# Patient Record
Sex: FEMALE | Race: WHITE | HISPANIC OR LATINO | URBAN - METROPOLITAN AREA
[De-identification: names, ages, dates, MRNs, and addresses within clinical notes are randomized per-mention and may not be internally consistent; named-entity substitution may affect disease eponyms.]

---

## 2019-10-15 ENCOUNTER — APPOINTMENT (OUTPATIENT)
Age: 39
Setting detail: DERMATOLOGY
End: 2019-10-15

## 2019-10-15 DIAGNOSIS — Q828 OTHER SPECIFIED ANOMALIES OF SKIN: ICD-10-CM

## 2019-10-15 DIAGNOSIS — L60.1 ONYCHOLYSIS: ICD-10-CM

## 2019-10-15 DIAGNOSIS — Q826 OTHER SPECIFIED ANOMALIES OF SKIN: ICD-10-CM

## 2019-10-15 DIAGNOSIS — Q819 OTHER SPECIFIED ANOMALIES OF SKIN: ICD-10-CM

## 2019-10-15 DIAGNOSIS — L81.1 CHLOASMA: ICD-10-CM

## 2019-10-15 PROBLEM — Q82.8 OTHER SPECIFIED CONGENITAL MALFORMATIONS OF SKIN: Status: ACTIVE | Noted: 2019-10-15

## 2019-10-15 PROCEDURE — OTHER COUNSELING: OTHER

## 2019-10-15 PROCEDURE — 99203 OFFICE O/P NEW LOW 30 MIN: CPT

## 2019-10-15 PROCEDURE — OTHER MIPS QUALITY: OTHER

## 2019-10-15 PROCEDURE — OTHER TREATMENT REGIMEN: OTHER

## 2019-10-15 ASSESSMENT — LOCATION DETAILED DESCRIPTION DERM
LOCATION DETAILED: LEFT LATERAL PROXIMAL UPPER ARM
LOCATION DETAILED: RIGHT INFERIOR CENTRAL MALAR CHEEK
LOCATION DETAILED: LEFT SMALL FINGERNAIL
LOCATION DETAILED: RIGHT GREAT TOENAIL
LOCATION DETAILED: LEFT GREAT TOENAIL
LOCATION DETAILED: LEFT INFERIOR CENTRAL MALAR CHEEK
LOCATION DETAILED: RIGHT LATERAL PROXIMAL UPPER ARM

## 2019-10-15 ASSESSMENT — LOCATION SIMPLE DESCRIPTION DERM
LOCATION SIMPLE: RIGHT GREAT TOE
LOCATION SIMPLE: RIGHT UPPER ARM
LOCATION SIMPLE: RIGHT CHEEK
LOCATION SIMPLE: LEFT CHEEK
LOCATION SIMPLE: LEFT UPPER ARM
LOCATION SIMPLE: LEFT GREAT TOE
LOCATION SIMPLE: LEFT SMALL FINGERNAIL

## 2019-10-15 ASSESSMENT — LOCATION ZONE DERM
LOCATION ZONE: ARM
LOCATION ZONE: FACE
LOCATION ZONE: TOENAIL
LOCATION ZONE: FINGERNAIL

## 2019-10-15 NOTE — PROCEDURE: TREATMENT REGIMEN
Detail Level: Zone
Plan: Will prescribe hydroquinone. Recommend in house pharmacy Melasma 8% cream.
Otc Regimen: Advised patient to use Amlactin

## 2019-10-15 NOTE — HPI: SKIN LESIONS
How Severe Is Your Skin Lesion?: mild
Have Your Skin Lesions Been Treated?: not been treated
Is This A New Presentation, Or A Follow-Up?: Skin Lesions
Additional History: Patient saw her PCP a week ago and was prescribed a anti-fungal cream, she states the cream hasn’t done much to help the affected areas. Patient states she has labs done to check if she was having any issues with her thyroid that was causing the issues in her nails. Patient had a clipping done on her nails and it came back negative for fungus.\\n\\nPatient denies preceding URI or GI symptoms, physical trauma to the affected areas, or psychosocial stressors, and does not take any prescription or OTC medications.

## 2020-01-06 ENCOUNTER — APPOINTMENT (OUTPATIENT)
Age: 40
Setting detail: DERMATOLOGY
End: 2020-01-07

## 2020-01-06 DIAGNOSIS — Z41.9 ENCOUNTER FOR PROCEDURE FOR PURPOSES OTHER THAN REMEDYING HEALTH STATE, UNSPECIFIED: ICD-10-CM

## 2020-01-06 DIAGNOSIS — Q826 OTHER SPECIFIED ANOMALIES OF SKIN: ICD-10-CM

## 2020-01-06 DIAGNOSIS — L57.8 OTHER SKIN CHANGES DUE TO CHRONIC EXPOSURE TO NONIONIZING RADIATION: ICD-10-CM

## 2020-01-06 DIAGNOSIS — L81.1 CHLOASMA: ICD-10-CM

## 2020-01-06 DIAGNOSIS — Q819 OTHER SPECIFIED ANOMALIES OF SKIN: ICD-10-CM

## 2020-01-06 DIAGNOSIS — L60.1 ONYCHOLYSIS: ICD-10-CM

## 2020-01-06 DIAGNOSIS — Q828 OTHER SPECIFIED ANOMALIES OF SKIN: ICD-10-CM

## 2020-01-06 PROBLEM — Q82.8 OTHER SPECIFIED CONGENITAL MALFORMATIONS OF SKIN: Status: ACTIVE | Noted: 2020-01-06

## 2020-01-06 PROCEDURE — OTHER PRODUCT LINE (HYPERPIGMENTATION): OTHER

## 2020-01-06 PROCEDURE — OTHER MIPS QUALITY: OTHER

## 2020-01-06 PROCEDURE — OTHER COUNSELING: OTHER

## 2020-01-06 PROCEDURE — OTHER COSMETIC CONSULTATION: BOTULINUM TOXIN: OTHER

## 2020-01-06 PROCEDURE — 99213 OFFICE O/P EST LOW 20 MIN: CPT

## 2020-01-06 PROCEDURE — OTHER OTHER: OTHER

## 2020-01-06 PROCEDURE — OTHER TREATMENT REGIMEN: OTHER

## 2020-01-06 ASSESSMENT — LOCATION DETAILED DESCRIPTION DERM
LOCATION DETAILED: LEFT CENTRAL MALAR CHEEK
LOCATION DETAILED: LEFT GREAT TOENAIL
LOCATION DETAILED: RIGHT CENTRAL MALAR CHEEK
LOCATION DETAILED: RIGHT PROXIMAL POSTERIOR UPPER ARM
LOCATION DETAILED: RIGHT GREAT TOENAIL
LOCATION DETAILED: LEFT PROXIMAL POSTERIOR UPPER ARM

## 2020-01-06 ASSESSMENT — LOCATION SIMPLE DESCRIPTION DERM
LOCATION SIMPLE: LEFT CHEEK
LOCATION SIMPLE: LEFT POSTERIOR UPPER ARM
LOCATION SIMPLE: RIGHT GREAT TOE
LOCATION SIMPLE: RIGHT POSTERIOR UPPER ARM
LOCATION SIMPLE: LEFT GREAT TOE
LOCATION SIMPLE: RIGHT CHEEK

## 2020-01-06 ASSESSMENT — LOCATION ZONE DERM
LOCATION ZONE: TOENAIL
LOCATION ZONE: FACE
LOCATION ZONE: ARM

## 2020-01-06 NOTE — PROCEDURE: PRODUCT LINE (HYPERPIGMENTATION)
Name Of Product 3: Obagi Clear Skin Bleaching & Corrector Cream Hydroquinone 4% Lot # [id number] Name Of Product 3: Obagi Clear Skin Bleaching & Corrector Cream Hydroquinone 4% Lot # [id number]

## 2020-01-06 NOTE — PROCEDURE: PRODUCT LINE (HYPERPIGMENTATION)
Product 6 Application Directions: Apply a thin layer to affected areas on face qhs X 3 months on/off as needed\\nLot#\\nExp:

## 2020-01-06 NOTE — PROCEDURE: PRODUCT LINE (HYPERPIGMENTATION)
Name Of Product 4: [employer]  Skin Lightener & Blending Cream Hydroquinone 4% Lot #[id number] Name Of Product 4: [employer]  Skin Lightener & Blending Cream Hydroquinone 4% Lot #[id number]

## 2020-01-06 NOTE — PROCEDURE: PRODUCT LINE (HYPERPIGMENTATION)
Product 1 Application Directions: Apply a thin layer to dark spots on face QHS x 3 months on/3 months off\\nLot. [id number]@12\\n2 refills Product 1 Application Directions: Apply a thin layer to dark spots on face QHS x 3 months on/3 months off\\nLot. [id number]@12\\n2 refills

## 2020-01-06 NOTE — PROCEDURE: PRODUCT LINE (HYPERPIGMENTATION)
Product 5 Application Directions: Apply a thin layer to dark spots once daily x 3 months on/3 months off

## 2020-01-06 NOTE — PROCEDURE: OTHER
Other (Free Text): 3 months ago she states she had some separation of the distal nails and some crumbly substance under the bilateral great toenails and her left fifth fingernail\\n\\nshe denies cleaning under the nail with any utensils\\nshe denies any new meds or OTC meds\\n\\nshe states she was told by her PCP to apply amy's vaporub (which he said would retard fungal growth).  she states it has been improving slowly\\n\\non exam today, her nails have no onycholysis, there is no subungual debris, all nails appear normal.  \\n\\nwe discussed that nail changes can be slow to improve and that there are many causes.  as she has had no labs for a long time, we discussed ordering baseline labs to evaluate her overall health
Other (Free Text): Labs ordered:\\nCMP\\nCBC\\nIron\\nTSH
Other (Free Text): IPL discussed\\nchem peels discussed too\\nsunscreen recommended
Note Text (......Xxx Chief Complaint.): This diagnosis correlates with the
Detail Level: Detailed

## 2021-06-03 ENCOUNTER — APPOINTMENT (OUTPATIENT)
Age: 41
Setting detail: DERMATOLOGY
End: 2021-06-03